# Patient Record
Sex: FEMALE | Race: WHITE
[De-identification: names, ages, dates, MRNs, and addresses within clinical notes are randomized per-mention and may not be internally consistent; named-entity substitution may affect disease eponyms.]

---

## 2020-02-01 ENCOUNTER — HOSPITAL ENCOUNTER (EMERGENCY)
Dept: HOSPITAL 25 - UCEAST | Age: 40
Discharge: HOME | End: 2020-02-01
Payer: COMMERCIAL

## 2020-02-01 VITALS — DIASTOLIC BLOOD PRESSURE: 78 MMHG | SYSTOLIC BLOOD PRESSURE: 121 MMHG

## 2020-02-01 DIAGNOSIS — Z91.09: ICD-10-CM

## 2020-02-01 DIAGNOSIS — J10.1: Primary | ICD-10-CM

## 2020-02-01 LAB — FLUAV RNA SPEC QL NAA+PROBE: POSITIVE

## 2020-02-01 PROCEDURE — 99212 OFFICE O/P EST SF 10 MIN: CPT

## 2020-02-01 PROCEDURE — G0463 HOSPITAL OUTPT CLINIC VISIT: HCPCS

## 2020-02-01 PROCEDURE — 87651 STREP A DNA AMP PROBE: CPT

## 2020-02-01 NOTE — UC
Throat Pain/Nasal [place] HPI





- HPI Summary


HPI Summary: 


38yo female presenting with sore throat, nasal congestion, cough, stomach ache 

since yesterday. Denies sob and wheezing. Denies n/v/d. Notes headache and 

fever of 101.4 last night. Taking ibuprofen for symptom relief. States daughter 

is currently being treated for strep and flu.








- History of Current Complaint


Stated Complaint: SORE THROAT


Hx Obtained From: Patient





- Allergies/Home Medications


Allergies/Adverse Reactions: 


 Allergies











Allergy/AdvReac Type Severity Reaction Status Date / Time


 


environmental Allergy  Congestion Uncoded 02/01/20 08:09











Home Medications: 


 Home Medications





Albuterol HFA INHALER* [Ventolin HFA Inhaler*] 2 puff INH Q4HR PRN 02/01/20 [

History Confirmed 02/01/20]


Fluticasone HFA 44 mcg(NF) [Flovent Hfa 44 mcg(NF)] 2 puff INH DAILY 02/01/20 [

History Confirmed 02/01/20]


Ibuprofen 400 mg PO ONCE PRN 02/01/20 [History Confirmed 02/01/20]


Metformin ER (NF) [Glucophage  MG TAB (NF)] 1 tab PO DAILY 02/01/20 [

History Confirmed 02/01/20]











PMH/Surg Hx/FS Hx/Imm Hx





- Family History


Known Family History: Positive: Non-Contributory





- Social History


Alcohol Use: None


Substance Use Type: None


Smoking Status (MU): Never Smoked Tobacco





Review of Systems


All Other Systems Reviewed And Are Negative: Yes


Constitutional: Positive: Fever, Fatigue


ENT: Positive: Sore Throat, Sinus Congestion


Respiratory: Positive: Cough.  Negative: Shortness Of Breath


Cardiovascular: Positive: Negative


Gastrointestinal: Positive: Abdominal Pain - "stomach ache".  Negative: Vomiting

, Diarrhea, Nausea


Musculoskeletal: Positive: Negative


Neurological: Positive: Headache





Physical Exam





- Summary


Physical Exam Summary: 


Vital Signs Reviewed: Yes


A+Ox3, no distress


Eyes: Conjunctiva Clear


ENT: Hearing grossly normal, TM x 2 clear, +nasal congestion, +PND, moist, 

uvula midline, no exudate, no erythema


Neck: Positive: Supple


Respiratory: Positive: No respiratory distress, No accessory muscle use + CTA 

throughout  no w/r


Cardiovascular: RRR  nl s1, s2  no m/r 


Musculoskeletal Exam: MUNOZ x 4 without difficulty


Neurological: Positive: Alert


Psychological: Positive: age appropriate behavior


Skin: Positive: no rash, no ecchymosis








Vital Signs: 





 Vital Signs (72 hours)











  02/01/20





  08:01


 


Temperature 98.7 F


 


Pulse Rate 96


 


Respiratory 18





Rate 


 


Blood Pressure 121/78





(mmHg) 


 


O2 Sat by Pulse 97





Oximetry 








 Lab Results











  02/01/20 02/01/20 Range/Units





  08:18 08:20 


 


Influenza A (Rapid)   Positive A  (Negative)  


 


Group A Strep Rapid  Negative   (Negative)  














Throat Pain/Nasal Course/Dx





- Course


Course Of Treatment: 


Positive rapid flu. Negative rapid strep. Patient requested tamiflu since her 

daughter is also taking it and her  is on venous presence.  I provided 

patient with prescription for Tamiflu.  Instructed to continue with symptomatic 

treatment and to follow up with PCP if symptoms worsen or persist.  Patient 

voiced understanding and agreed with the treatment plan.








- Differential Dx/Diagnosis


Provider Diagnosis: 


 Influenza A








Discharge ED





- Sign-Out/Discharge


Documenting (check all that apply): Patient Departure


All imaging exams completed and their final reports reviewed: No Studies





- Discharge Plan


Condition: Stable


Disposition: HOME


Prescriptions: 


Oseltamivir CAP* [Tamiflu CAP*] 75 mg PO BID #10 cap


Patient Education Materials:  Influenza (ED)


Referrals: 


Flor Dia MD [Primary Care Provider] - If Needed


Additional Instructions: 


You tested positive for influenza today and negative for strep throat.


Take tamiflu as prescribed.


You may continue with ibuprofen for fever and pain relief.


Get plenty of rest and increase your fluid intake.


Follow up with your primary care provider if symptoms do not resolve within 5-7 

days.


Go to the emergency room with any new or worsening symptoms.








- Billing Disposition and Condition


Condition: STABLE


Disposition: Home





- Attestation Statements


Provider Attestation: 


This patient was not seen by me.


I was available for consult.


Chart reviewed.





PORSHA